# Patient Record
Sex: FEMALE | Race: BLACK OR AFRICAN AMERICAN | Employment: FULL TIME | URBAN - METROPOLITAN AREA
[De-identification: names, ages, dates, MRNs, and addresses within clinical notes are randomized per-mention and may not be internally consistent; named-entity substitution may affect disease eponyms.]

---

## 2017-09-21 ENCOUNTER — HOSPITAL ENCOUNTER (EMERGENCY)
Age: 22
Discharge: HOME | End: 2017-09-21
Attending: EMERGENCY MEDICINE
Payer: SELF-PAY

## 2017-09-21 VITALS
OXYGEN SATURATION: 99 % | TEMPERATURE: 98.4 F | BODY MASS INDEX: 28.32 KG/M2 | RESPIRATION RATE: 16 BRPM | WEIGHT: 150 LBS | DIASTOLIC BLOOD PRESSURE: 82 MMHG | HEIGHT: 61 IN | HEART RATE: 102 BPM | SYSTOLIC BLOOD PRESSURE: 144 MMHG

## 2017-09-21 DIAGNOSIS — R04.0 EPISTAXIS: ICD-10-CM

## 2017-09-21 DIAGNOSIS — R05.9 COUGH: ICD-10-CM

## 2017-09-21 DIAGNOSIS — J31.0 RHINITIS, UNSPECIFIED TYPE: Primary | ICD-10-CM

## 2017-09-21 LAB — DEPRECATED S PYO AG THROAT QL EIA: NEGATIVE

## 2017-09-21 PROCEDURE — 87880 STREP A ASSAY W/OPTIC: CPT | Performed by: EMERGENCY MEDICINE

## 2017-09-21 PROCEDURE — 87081 CULTURE SCREEN ONLY: CPT | Performed by: EMERGENCY MEDICINE

## 2017-09-21 PROCEDURE — 99282 EMERGENCY DEPT VISIT SF MDM: CPT | Performed by: PHYSICIAN ASSISTANT

## 2017-09-21 NOTE — DISCHARGE INSTRUCTIONS
May use Mucinex (Over the Counter) for cough. Avoid antihistamines and decongestants to avoid drying out your mucous membranes. May use Vaporizer or humidifier to keep air moist. Discuss getting rid of black mold with landlord as this may be contributing to your symptoms. May also use Tylenol as directed for pain/fever. If you should have any change in your symptoms, worsening symptoms, or concerns return to the ER. Cough: Care Instructions  Your Care Instructions  A cough is your body's response to something that bothers your throat or airways. Many things can cause a cough. You might cough because of a cold or the flu, bronchitis, or asthma. Smoking, postnasal drip, allergies, and stomach acid that backs up into your throat also can cause coughs. A cough is a symptom, not a disease. Most coughs stop when the cause, such as a cold, goes away. You can take a few steps at home to cough less and feel better. Follow-up care is a key part of your treatment and safety. Be sure to make and go to all appointments, and call your doctor if you are having problems. It's also a good idea to know your test results and keep a list of the medicines you take. How can you care for yourself at home? · Drink lots of water and other fluids. This helps thin the mucus and soothes a dry or sore throat. Honey or lemon juice in hot water or tea may ease a dry cough. · Take cough medicine as directed by your doctor. · Prop up your head on pillows to help you breathe and ease a dry cough. · Try cough drops to soothe a dry or sore throat. Cough drops don't stop a cough. Medicine-flavored cough drops are no better than candy-flavored drops or hard candy. · Do not smoke. Avoid secondhand smoke. If you need help quitting, talk to your doctor about stop-smoking programs and medicines. These can increase your chances of quitting for good. When should you call for help?   Call 911 anytime you think you may need emergency care. For example, call if:  · You have severe trouble breathing. Call your doctor now or seek immediate medical care if:  · You cough up blood. · You have new or worse trouble breathing. · You have a new or higher fever. · You have a new rash. Watch closely for changes in your health, and be sure to contact your doctor if:  · You cough more deeply or more often, especially if you notice more mucus or a change in the color of your mucus. · You have new symptoms, such as a sore throat, an earache, or sinus pain. · You do not get better as expected. Where can you learn more? Go to http://myke-ye.info/. Enter D279 in the search box to learn more about \"Cough: Care Instructions. \"  Current as of: March 25, 2017  Content Version: 11.3  © 1718-7183 [employer]. Care instructions adapted under license by [employer] (which disclaims liability or warranty for this information). If you have questions about a medical condition or this instruction, always ask your healthcare professional. [place] [address] any warranty or liability for your use of this information. Nosebleeds: Care Instructions  Your Care Instructions    Nosebleeds are common, especially if you have colds or allergies. Many things can cause a nosebleed. Some nosebleeds stop on their own with pressure. Others need packing. Some get cauterized (sealed). If you have gauze or other packing materials in your nose, you will need to follow up with your doctor to have the packing removed. You may need more treatment if you get nosebleeds a lot. The doctor has checked you carefully, but problems can develop later. If you notice any problems or new symptoms, get medical treatment right away. Follow-up care is a key part of your treatment and safety. Be sure to make and go to all appointments, and call your doctor if you are having problems.  It's also a good idea to know your test results and keep a list of the medicines you take. How can you care for yourself at home? · If you get another nosebleed:  ¨ Sit up and tilt your head slightly forward. This keeps blood from going down your throat. ¨ Use your thumb and index finger to pinch your nose shut for 10 minutes. Use a clock. Do not check to see if the bleeding has stopped before the 10 minutes are up. If the bleeding has not stopped, pinch your nose shut for another 10 minutes. ¨ When the bleeding has stopped, try not to pick, rub, or blow your nose for 12 hours. Avoiding these things helps keep your nose from bleeding again. · If your doctor prescribed antibiotics, take them as directed. Do not stop taking them just because you feel better. You need to take the full course of antibiotics. To prevent nosebleeds  · Do not blow your nose too hard. · Try not to lift or strain after a nosebleed. · Raise your head on a pillow while you sleep. · Put a thin layer of a saline- or water-based nasal gel, such as NasoGel, inside your nose. Put it on the septum, which divides your nostrils. This will prevent dryness that can cause nosebleeds. · Use a vaporizer or humidifier to add moisture to your bedroom. Follow the directions for cleaning the machine. · Do not use aspirin, ibuprofen (Advil, Motrin), or naproxen (Aleve) for 36 to 48 hours after a nosebleed unless your doctor tells you to. You can use acetaminophen (Tylenol) for pain relief. · Talk to your doctor about stopping any other medicines you are taking. Some medicines may make you more likely to get a nosebleed. · Do not use cold medicines or nasal sprays without first talking to your doctor. They can make your nose dry. When should you call for help? Call 911 anytime you think you may need emergency care. For example, call if:  · You passed out (lost consciousness).   Call your doctor now or seek immediate medical care if:  · You get another nosebleed and your nose is still bleeding after you have applied pressure 3 times for 10 minutes each time (30 minutes total). · There is a lot of blood running down the back of your throat even after you pinch your nose and tilt your head forward. · You have a fever. · You have sinus pain. Watch closely for changes in your health, and be sure to contact your doctor if:  · You get nosebleeds often, even if they stop. · You do not get better as expected. Where can you learn more? Go to http://myke-ye.info/. Enter S156 in the search box to learn more about \"Nosebleeds: Care Instructions. \"  Current as of: March 20, 2017  Content Version: 11.3  © 2501-0900 [employer]. Care instructions adapted under license by [employer] (which disclaims liability or warranty for this information). If you have questions about a medical condition or this instruction, always ask your healthcare professional. [place] [address] any warranty or liability for your use of this information. Rhinitis: Care Instructions  Your Care Instructions  Rhinitis is swelling and irritation in the nose. Allergies and infections are often the cause. Your nose may run or feel stuffy. Other symptoms are itchy and sore eyes, ears, throat, and mouth. If allergies are the cause, your doctor may do tests to find out what you are allergic to. You may be able to stop symptoms if you avoid the things that cause them. Your doctor may suggest or prescribe medicine to ease your symptoms. Follow-up care is a key part of your treatment and safety. Be sure to make and go to all appointments, and call your doctor if you are having problems. It's also a good idea to know your test results and keep a list of the medicines you take. How can you care for yourself at home? · If your rhinitis is caused by allergies, try to find out what sets off (triggers) your symptoms. Take steps to avoid these triggers. ¨ Avoid yard work. It can stir up both pollen and mold. ¨ Do not smoke or allow others to smoke around you. If you need help quitting, talk to your doctor about stop-smoking programs and medicines. These can increase your chances of quitting for good. ¨ Do not use aerosol sprays, cleaning products, or perfumes. ¨ If pollen is one of your triggers, close your house and car windows during blooming season. ¨ Clean your house often to control dust.  ¨ Keep pets outside. · If your doctor recommends over-the-counter medicines to relieve symptoms, take your medicines exactly as prescribed. Call your doctor if you think you are having a problem with your medicine. · Use saline (saltwater) nasal washes to help keep your nasal passages open and wash out mucus and bacteria. You can buy saline nose drops at a grocery store or drugstore. Or you can make your own at home by adding 1 teaspoon of salt and 1 teaspoon of baking soda to 2 cups of distilled water. If you make your own, fill a bulb syringe with the solution, insert the tip into your nostril, and squeeze gently. García Peels your nose. When should you call for help? Call your doctor now or seek immediate medical care if:  · You are having trouble breathing. Watch closely for changes in your health, and be sure to contact your doctor if:  · Mucus from your nose gets thicker (like pus) or has new blood in it. · You have new or worse symptoms. · You do not get better as expected. Where can you learn more? Go to http://myke-ye.info/. Enter M030 in the search box to learn more about \"Rhinitis: Care Instructions. \"  Current as of: July 29, 2016  Content Version: 11.3  © 4466-9618 [employer]. Care instructions adapted under license by [employer] (which disclaims liability or warranty for this information).  If you have questions about a medical condition or this instruction, always ask your healthcare professional. Nataliia Hong disclaims any warranty or liability for your use of this information.

## 2017-09-21 NOTE — ED NOTES
I have reviewed discharge instructions with the patient. The patient verbalized understanding. Patient left ED via Discharge Method: ambulatory to Home with self. Opportunity for questions and clarification provided. Patient given 1 scripts. Patient is in no acute distress at this time.

## 2017-09-21 NOTE — ED PROVIDER NOTES
HPI Comments: 51-year-old -American female presents with dry cough for approximately one week. She states that for the last 4 days she has also experienced nosebleeds. She states that she does have nasal congestion and rhinorrhea as well as a sore, scratchy throat. Patient denies any chest pain, inspiratory pain or hemoptysis. She denies any chills or fever. Patient states that she has had intermittent headaches over the past couple of days as well. Patient denies smoking cigarettes. She states that she recently found out that there is black mold in the home where she is living. Patient is a 25 y.o. female presenting with cough. The history is provided by the patient. Cough   This is a new problem. The current episode started more than 1 week ago. Episode frequency: INTERMITTENT. The problem has not changed since onset. The cough is non-productive. There has been no fever. Associated symptoms include headaches, rhinorrhea and sore throat. Pertinent negatives include no chest pain, no chills, no sweats, no eye redness, no ear congestion, no ear pain, no myalgias, no shortness of breath, no wheezing, no nausea and no vomiting. She has tried nothing for the symptoms. She is not a smoker. History reviewed. No pertinent past medical history. Past Surgical History:   Procedure Laterality Date     DELIVERY ONLY      HX  SECTION      HX HEENT      tooth removed         Family History:   Problem Relation Age of Onset    Hypertension Mother     Hypertension Maternal Grandfather     Breast Cancer Maternal Grandmother 61       Social History     Social History    Marital status: SINGLE     Spouse name: N/A    Number of children: N/A    Years of education: N/A     Occupational History    Not on file.      Social History Main Topics    Smoking status: Never Smoker    Smokeless tobacco: Never Used    Alcohol use No    Drug use: No    Sexual activity: Yes     Partners: Male Birth control/ protection: Pill     Other Topics Concern    Not on file     Social History Narrative         ALLERGIES: Review of patient's allergies indicates no known allergies. Review of Systems   Constitutional: Negative for chills, fatigue and fever. HENT: Positive for congestion, nosebleeds, postnasal drip, rhinorrhea, sneezing and sore throat. Negative for ear pain, sinus pain, sinus pressure, trouble swallowing and voice change. Eyes: Negative for discharge, redness and itching. Respiratory: Positive for cough. Negative for chest tightness, shortness of breath and wheezing. Cardiovascular: Negative for chest pain and palpitations. Gastrointestinal: Negative for abdominal pain, diarrhea, nausea and vomiting. Genitourinary: Negative for decreased urine volume and difficulty urinating. Musculoskeletal: Negative for myalgias, neck pain and neck stiffness. Neurological: Positive for headaches. Negative for dizziness and light-headedness. Vitals:    09/21/17 0926   BP: 144/82   Pulse: (!) 102   Resp: 16   Temp: 98.4 °F (36.9 °C)   SpO2: 99%   Weight: 68 kg (150 lb)   Height: 5' 1\" (1.549 m)            Physical Exam   Constitutional: She is oriented to person, place, and time. Vital signs are normal. She appears well-developed and well-nourished. She is active. Non-toxic appearance. She does not appear ill. No distress. Patient is ambulatory and moving about in exam room without difficulty. She does not appear ill or toxic. HENT:   Head: Normocephalic and atraumatic. Right Ear: Tympanic membrane normal.   Left Ear: Tympanic membrane normal.   Nose: Mucosal edema present. Mouth/Throat: Uvula is midline, oropharynx is clear and moist and mucous membranes are normal.   Eyes: Conjunctivae and EOM are normal. Pupils are equal, round, and reactive to light. No scleral icterus. Neck: Normal range of motion and full passive range of motion without pain. Neck supple.  No spinous process tenderness and no muscular tenderness present. No rigidity. Normal range of motion present. No Brudzinski's sign and no Kernig's sign noted. No thyromegaly present. Cardiovascular: Normal rate, regular rhythm, S1 normal, S2 normal and normal heart sounds. Exam reveals no gallop and no friction rub. No murmur heard. Pulmonary/Chest: Effort normal and breath sounds normal. No accessory muscle usage. No respiratory distress. She has no decreased breath sounds. She has no wheezes. She has no rhonchi. She has no rales. She exhibits no tenderness. Abdominal: Soft. Normal appearance, normal aorta and bowel sounds are normal. She exhibits no distension. There is no tenderness. There is no rigidity, no rebound and no guarding. Musculoskeletal: Normal range of motion. She exhibits no edema or tenderness. Lymphadenopathy:     She has no cervical adenopathy. Neurological: She is alert and oriented to person, place, and time. She has normal strength. No cranial nerve deficit or sensory deficit. Coordination and gait normal.   No focal neurological deficits identified    Skin: Skin is warm, dry and intact. No cyanosis. Nails show no clubbing. Psychiatric: She has a normal mood and affect. Her speech is normal and behavior is normal.   Nursing note and vitals reviewed. MDM  Number of Diagnoses or Management Options  Cough: new and does not require workup  Epistaxis: new and does not require workup  Rhinitis, unspecified type: new and does not require workup  Diagnosis management comments: I advised patient to discuss the black mold issue with her landlord. I discussed with her that as long as she was exposed to the black mold she would probably still have symptoms.   I also advised the use of a humidifier or a vaporizer and also to apply moisturizing Vaseline to the inside of her nostrils at that time to avoid her nostrils becoming dried out and irritated which may be contributing to her nosebleeds.     Risk of Complications, Morbidity, and/or Mortality  Presenting problems: low  Management options: low    Patient Progress  Patient progress: stable    ED Course       Procedures

## 2017-09-24 LAB
BACTERIA SPEC CULT: ABNORMAL
SERVICE CMNT-IMP: ABNORMAL

## 2018-06-18 PROBLEM — Z34.81 MULTIGRAVIDA IN FIRST TRIMESTER: Status: ACTIVE | Noted: 2018-06-18

## 2018-06-18 PROBLEM — Z87.59 HISTORY OF GESTATIONAL HYPERTENSION: Status: ACTIVE | Noted: 2018-06-18

## 2018-06-18 PROBLEM — Z98.891 PREVIOUS CESAREAN SECTION: Status: ACTIVE | Noted: 2018-06-18

## 2022-03-19 PROBLEM — Z98.891 PREVIOUS CESAREAN SECTION: Status: ACTIVE | Noted: 2018-06-18

## 2022-03-20 PROBLEM — Z87.59 HISTORY OF GESTATIONAL HYPERTENSION: Status: ACTIVE | Noted: 2018-06-18

## 2022-03-20 PROBLEM — Z34.81 MULTIGRAVIDA IN FIRST TRIMESTER: Status: ACTIVE | Noted: 2018-06-18

## 2025-01-30 ENCOUNTER — HOSPITAL ENCOUNTER (EMERGENCY)
Age: 30
Discharge: HOME | End: 2025-01-30
Attending: EMERGENCY MEDICINE
Payer: COMMERCIAL

## 2025-01-30 VITALS
SYSTOLIC BLOOD PRESSURE: 119 MMHG | WEIGHT: 175 LBS | HEART RATE: 98 BPM | RESPIRATION RATE: 16 BRPM | BODY MASS INDEX: 33.04 KG/M2 | DIASTOLIC BLOOD PRESSURE: 87 MMHG | OXYGEN SATURATION: 98 % | TEMPERATURE: 99.3 F | HEIGHT: 61 IN

## 2025-01-30 DIAGNOSIS — N39.0 ACUTE UTI: Primary | ICD-10-CM

## 2025-01-30 DIAGNOSIS — J10.1 INFLUENZA A: ICD-10-CM

## 2025-01-30 LAB
BACTERIA URNS QL MICRO: ABNORMAL /HPF
BILIRUB UR QL: NEGATIVE
CASTS URNS QL MICRO: 0 /LPF
CRYSTALS URNS QL MICRO: 0 /LPF
EPI CELLS #/AREA URNS HPF: ABNORMAL /HPF
GLUCOSE UR QL STRIP.AUTO: NEGATIVE MG/DL
HCG UR QL: NEGATIVE
KETONES UR-MCNC: ABNORMAL MG/DL
LEUKOCYTE ESTERASE UR QL STRIP: NEGATIVE
MUCOUS THREADS URNS QL MICRO: 0 /LPF
NITRITE UR QL: POSITIVE
PH UR: 5.5 (ref 5–9)
PROT UR QL: 30 MG/DL
RBC # UR STRIP: ABNORMAL
RBC #/AREA URNS HPF: ABNORMAL /HPF
SARS-COV-2 RDRP RESP QL NAA+PROBE: NOT DETECTED
SERVICE CMNT-IMP: ABNORMAL
SOURCE: NORMAL
SP GR UR: >1.03 (ref 1–1.02)
STREP, MOLECULAR: NOT DETECTED
UROBILINOGEN UR QL: 0.2 EU/DL (ref 0.2–1)
WBC URNS QL MICRO: ABNORMAL /HPF

## 2025-01-30 PROCEDURE — 99283 EMERGENCY DEPT VISIT LOW MDM: CPT

## 2025-01-30 PROCEDURE — 81025 URINE PREGNANCY TEST: CPT

## 2025-01-30 PROCEDURE — 87502 INFLUENZA DNA AMP PROBE: CPT

## 2025-01-30 PROCEDURE — 6370000000 HC RX 637 (ALT 250 FOR IP): Performed by: EMERGENCY MEDICINE

## 2025-01-30 PROCEDURE — 87651 STREP A DNA AMP PROBE: CPT

## 2025-01-30 PROCEDURE — 81001 URINALYSIS AUTO W/SCOPE: CPT

## 2025-01-30 PROCEDURE — 87635 SARS-COV-2 COVID-19 AMP PRB: CPT

## 2025-01-30 RX ORDER — CEPHALEXIN 500 MG/1
500 CAPSULE ORAL
Status: COMPLETED | OUTPATIENT
Start: 2025-01-30 | End: 2025-01-30

## 2025-01-30 RX ORDER — ACETAMINOPHEN 500 MG
1000 TABLET ORAL
Status: COMPLETED | OUTPATIENT
Start: 2025-01-30 | End: 2025-01-30

## 2025-01-30 RX ORDER — OSELTAMIVIR PHOSPHATE 75 MG/1
75 CAPSULE ORAL 2 TIMES DAILY
Qty: 10 CAPSULE | Refills: 0 | Status: SHIPPED | OUTPATIENT
Start: 2025-01-30 | End: 2025-02-04

## 2025-01-30 RX ORDER — CEPHALEXIN 500 MG/1
500 CAPSULE ORAL 2 TIMES DAILY
Qty: 14 CAPSULE | Refills: 0 | Status: SHIPPED | OUTPATIENT
Start: 2025-01-30 | End: 2025-01-30

## 2025-01-30 RX ORDER — CEPHALEXIN 500 MG/1
500 CAPSULE ORAL 2 TIMES DAILY
Qty: 20 CAPSULE | Refills: 0 | Status: SHIPPED | OUTPATIENT
Start: 2025-01-30 | End: 2025-02-09

## 2025-01-30 RX ORDER — BENZONATATE 100 MG/1
100 CAPSULE ORAL 3 TIMES DAILY PRN
Qty: 21 CAPSULE | Refills: 0 | Status: SHIPPED | OUTPATIENT
Start: 2025-01-30 | End: 2025-02-06

## 2025-01-30 RX ADMIN — CEPHALEXIN 500 MG: 500 CAPSULE ORAL at 20:00

## 2025-01-30 RX ADMIN — ACETAMINOPHEN 1000 MG: 500 TABLET, FILM COATED ORAL at 20:00

## 2025-01-30 ASSESSMENT — PAIN - FUNCTIONAL ASSESSMENT: PAIN_FUNCTIONAL_ASSESSMENT: 0-10

## 2025-01-30 ASSESSMENT — ENCOUNTER SYMPTOMS
SORE THROAT: 1
TROUBLE SWALLOWING: 0
ABDOMINAL PAIN: 0
DIARRHEA: 0
BACK PAIN: 0
RHINORRHEA: 1
WHEEZING: 0
ABDOMINAL DISTENTION: 0
NAUSEA: 0
COUGH: 1
SHORTNESS OF BREATH: 0
VOMITING: 0
VOICE CHANGE: 0

## 2025-01-30 ASSESSMENT — LIFESTYLE VARIABLES
HOW OFTEN DO YOU HAVE A DRINK CONTAINING ALCOHOL: NEVER
HOW MANY STANDARD DRINKS CONTAINING ALCOHOL DO YOU HAVE ON A TYPICAL DAY: PATIENT DOES NOT DRINK

## 2025-01-30 ASSESSMENT — PAIN DESCRIPTION - LOCATION: LOCATION: LEG

## 2025-01-30 ASSESSMENT — PAIN DESCRIPTION - DESCRIPTORS: DESCRIPTORS: ACHING

## 2025-01-30 ASSESSMENT — PAIN SCALES - GENERAL: PAINLEVEL_OUTOF10: 7

## 2025-01-30 ASSESSMENT — PAIN DESCRIPTION - ORIENTATION: ORIENTATION: RIGHT;LEFT

## 2025-01-30 NOTE — ED TRIAGE NOTES
Patient has been running a fever since last night, cough, chills, sweats, fatigue, bilateral aching in legs, headache, and feels dehydrated. She took tylenol last night but nothing today.

## 2025-01-31 LAB
FLUAV RNA SPEC QL NAA+PROBE: DETECTED
FLUBV RNA SPEC QL NAA+PROBE: NOT DETECTED

## 2025-01-31 NOTE — DISCHARGE INSTRUCTIONS
Take antibiotic for UTI.  Alternate between Tylenol and Motrin as directed.  Ensure that you remain hydrated.  Drink plenty of fluids.  Take Tessalon Perles for cough.  Schedule close follow-up with primary care physician.    Return to ED if symptoms worsen or progress in any way

## 2025-01-31 NOTE — ED PROVIDER NOTES
Emergency Department Provider Note       PCP: No primary care provider on file.   Age: 29 y.o.   Sex: female     DISPOSITION Decision To Discharge 01/30/2025 07:42:00 PM    ICD-10-CM    1. Acute UTI  N39.0       2. Influenza A  J10.1           Medical Decision Making     29-year-old female presents with complaint of sore throat, rhinorrhea, nasal congestion, fatigue that is progressed since last night.  Reports low-grade fever.  Uncertain recent sick contacts.  Vital signs stable.  Afebrile.  COVID, flu negative.  Urine pregnancy test negative.  Urine dipstick with positive nitrite.  UA with 4+ bacteria, 10-20 WBCs.  Urine culture added on.  Patient given Keflex 500 mg p.o.  Flu A swab positive.  Will discharge home with Keflex, Tessandie De La Pazes.  Patient instructed on importance of remaining hydrated.  Instructed alternate between Tylenol Motrin as directed.  Patient instructed to follow-up primary care physician and given strict return precautions.         1 acute complicated illness or injury.  Over the counter drug management performed.  Prescription drug management performed.  Shared medical decision making was utilized in creating the patients health plan today.  I independently ordered and reviewed each unique test.    I reviewed external records: provider visit note from PCP.             The patient has an Upper Respiratory Infection.  Antibiotics were prescribed for the following acute concurrent infection UTI.        History     29-year-old female presents with complaint of sore throat, rhinorrhea, nasal congestion, fatigue that is progressed since last night.  Reports low-grade fever.  Uncertain recent sick contacts.  Denies any flank pain, abdominal pain, nausea, vomiting, diarrhea.  Denies taking thing for pain prior to arrival.  Denies any pelvic pain, vaginal discharge, vaginal bleeding.  Denies any shortness of breath, chest pain, neck stiffness.     The history is provided by the patient. No